# Patient Record
Sex: FEMALE | Race: WHITE
[De-identification: names, ages, dates, MRNs, and addresses within clinical notes are randomized per-mention and may not be internally consistent; named-entity substitution may affect disease eponyms.]

---

## 2017-06-25 ENCOUNTER — HOSPITAL ENCOUNTER (EMERGENCY)
Dept: HOSPITAL 63 - ER | Age: 57
Discharge: ANOTHER HOSPITAL | End: 2017-06-25
Payer: OTHER GOVERNMENT

## 2017-06-25 VITALS — SYSTOLIC BLOOD PRESSURE: 114 MMHG | DIASTOLIC BLOOD PRESSURE: 53 MMHG

## 2017-06-25 VITALS — HEIGHT: 64 IN | BODY MASS INDEX: 24.46 KG/M2 | WEIGHT: 143.3 LBS

## 2017-06-25 DIAGNOSIS — K35.80: Primary | ICD-10-CM

## 2017-06-25 LAB
ALBUMIN SERPL-MCNC: 4.4 G/DL (ref 3.4–5)
ALBUMIN/GLOB SERPL: 1.2 {RATIO} (ref 1–1.7)
ALP SERPL-CCNC: 73 U/L (ref 46–116)
ALT SERPL-CCNC: 43 U/L (ref 14–59)
ANION GAP SERPL CALC-SCNC: 10 MMOL/L (ref 6–14)
AST SERPL-CCNC: 71 U/L (ref 15–37)
BASOPHILS # BLD AUTO: 0 X10^3/UL (ref 0–0.2)
BASOPHILS NFR BLD: 0 % (ref 0–3)
BILIRUB SERPL-MCNC: 1.1 MG/DL (ref 0.2–1)
BUN/CREAT SERPL: 16 (ref 6–20)
CA-I SERPL ISE-MCNC: 16 MG/DL (ref 7–20)
CALCIUM SERPL-MCNC: 9.1 MG/DL (ref 8.5–10.1)
CHLORIDE SERPL-SCNC: 103 MMOL/L (ref 98–107)
CO2 SERPL-SCNC: 27 MMOL/L (ref 21–32)
CREAT SERPL-MCNC: 1 MG/DL (ref 0.6–1)
EOSINOPHIL NFR BLD: 0 % (ref 0–3)
EOSINOPHIL NFR BLD: 0 X10^3/UL (ref 0–0.7)
ERYTHROCYTE [DISTWIDTH] IN BLOOD BY AUTOMATED COUNT: 12.9 % (ref 11.5–14.5)
GFR SERPLBLD BASED ON 1.73 SQ M-ARVRAT: 57.4 ML/MIN
GLOBULIN SER-MCNC: 3.6 G/DL (ref 2.2–3.8)
GLUCOSE SERPL-MCNC: 143 MG/DL (ref 70–99)
HCT VFR BLD CALC: 37.7 % (ref 36–47)
HGB BLD-MCNC: 12.3 G/DL (ref 12–15.5)
LIPASE: 85 U/L (ref 73–393)
LYMPHOCYTES # BLD: 1.6 X10^3/UL (ref 1–4.8)
LYMPHOCYTES NFR BLD AUTO: 12 % (ref 24–48)
MCH RBC QN AUTO: 31 PG (ref 25–35)
MCHC RBC AUTO-ENTMCNC: 33 G/DL (ref 31–37)
MCV RBC AUTO: 95 FL (ref 79–100)
MONO #: 0.7 X10^3/UL (ref 0–1.1)
MONOCYTES NFR BLD: 5 % (ref 0–9)
NEUT #: 11.4 X10^3UL (ref 1.8–7.7)
NEUTROPHILS NFR BLD AUTO: 83 % (ref 31–73)
PLATELET # BLD AUTO: 177 X10^3/UL (ref 140–400)
POTASSIUM SERPL-SCNC: 3.8 MMOL/L (ref 3.5–5.1)
PROT SERPL-MCNC: 8 G/DL (ref 6.4–8.2)
RBC # BLD AUTO: 3.98 X10^6/UL (ref 3.5–5.4)
SODIUM SERPL-SCNC: 140 MMOL/L (ref 136–145)
WBC # BLD AUTO: 13.8 X10^3/UL (ref 4–11)

## 2017-06-25 PROCEDURE — 80053 COMPREHEN METABOLIC PANEL: CPT

## 2017-06-25 PROCEDURE — 99285 EMERGENCY DEPT VISIT HI MDM: CPT

## 2017-06-25 PROCEDURE — 96361 HYDRATE IV INFUSION ADD-ON: CPT

## 2017-06-25 PROCEDURE — 85027 COMPLETE CBC AUTOMATED: CPT

## 2017-06-25 PROCEDURE — 36415 COLL VENOUS BLD VENIPUNCTURE: CPT

## 2017-06-25 PROCEDURE — 96375 TX/PRO/DX INJ NEW DRUG ADDON: CPT

## 2017-06-25 PROCEDURE — 96365 THER/PROPH/DIAG IV INF INIT: CPT

## 2017-06-25 PROCEDURE — 74177 CT ABD & PELVIS W/CONTRAST: CPT

## 2017-06-25 PROCEDURE — 83690 ASSAY OF LIPASE: CPT

## 2017-06-25 RX ADMIN — SODIUM CHLORIDE, PRESERVATIVE FREE PRN ML: 5 INJECTION INTRAVENOUS at 00:40

## 2017-06-25 RX ADMIN — SODIUM CHLORIDE, PRESERVATIVE FREE PRN ML: 5 INJECTION INTRAVENOUS at 00:41

## 2017-06-25 NOTE — RAD
EXAM: Abdomen and pelvis CT with intravenous contrast.

 

HISTORY: 56-year-old female with severe umbilical pain and cramping, 

nausea and vomiting. No previous abdominal surgeries.

 

TECHNIQUE: Computed tomographic images of the abdomen and pelvis were 

obtained following the administration of 75 cc of Omni 300 intravenous 

contrast. Multiplanar reformatting was performed.

 

PQRS compliance statement:  One or more of the following individualized 

dose reduction techniques were utilized for this examination:  1. 

Automated exposure control  2. Adjustment of the mA and/or kV according to

patient size  3. Use of iterative reconstruction technique

 

COMPARISON: 

None.

 

FINDINGS: 

The lung bases demonstrate no acute finding.

 

Periportal edema is present within the liver, as well as prominence of the

IVC, suggesting fluid resuscitation. Otherwise, the liver, spleen, 

gallbladder, pancreas, adrenal glands and bilateral kidneys demonstrate no

focal abnormality. The GI tract demonstrates no dilated bowel loops to 

suggest obstruction. A tubular structure with internal hyperdensity is 

seen within the superior pelvis along the midline, which appears to 

originate from the cecum anteriorly. This tubular structure measures up to

1.2 cm in diameter. A single focus of air is seen distally which appears 

to reside within the distal lumen, which resides within the right lower 

quadrant. This is most suggestive of an enlarged appendix. This is best 

visualized within axial images 57 through 62 and coronal images 33 through

35. No adjacent free air or free fluid is seen. The urinary bladder is 

grossly unremarkable. Trace free fluid is present in the pelvis, which may

be physiologic. Otherwise, no intra-abdominal or pelvic free fluid, free 

air or significant lymphadenopathy is seen. Aorta is normal in caliber. 

There are prominent vessels demonstrated along the periphery of the 

uterus, with calcifications within the region of the uterine fundus which 

may suggest a fibroid.

 

Overlying soft tissues and visualized osseous structures demonstrate no 

acute or suspicious finding.

 

IMPRESSION:

1. CT findings consistent with acute appendicitis. No evidence of 

perforation or adjacent abscess formation.

2. Probable fundal uterine fibroid, can be best evaluated with nonemergent

ultrasound.

 

Electronically signed by: Tamiko Magana MD (6/25/2017 1:48 AM)

## 2017-06-25 NOTE — PHYS DOC
Past History


Past Medical History:  No Pertinent History


Past Surgical History:  No Surgical History


Smoking:  Non-smoker


Alcohol Use:  Occasionally


Drug Use:  None





Adult General


Chief Complaint


Chief Complaint:  nausea vomiting diarrhea





[facility]





This is a pleasant 56 year old female with no major medical problems no major 

surgeries or past history presenting with nausea vomiting diarrhea that began 

yesterday after eating with the  suspects is bad fish. This morning when 

she woke she developed crampy lower abdominal pain with nonbilious nonbloody 

vomiting 4 with multiple episodes of retching. She's had loose stool without 

mucus or blood as well. She's had chills without subjective fever she denies 

any prior symptoms like this in the past. She denies any recent antibiotic use, 

travel outside the country, she has not consumed any raw food-like oysters or 

fish, denies any sick contacts, denies any heavily of poultry or reptiles.





Review of Systems


Review of Systems





Constitutional: She has complained of subjective fevers and chills.


Eyes: Denies change in visual acuity, redness, or eye pain []


HENT: Denies nasal congestion or sore throat []


Respiratory: Denies cough or shortness of breath []


Cardiovascular: No additional information not addressed in HPI []


GI: The seventh history of nausea and nonbilious nonbloody vomiting diarrhea 

without blood or mucus.


: Denies dysuria or hematuria []


Musculoskeletal: Denies back pain or joint pain []


Integument: Denies rash or skin lesions []


Neurologic: Denies headache, focal weakness or sensory changes []


Endocrine: Denies polyuria or polydipsia []





Allergies


Allergies





Allergies








Coded Allergies Type Severity Reaction Last Updated Verified


 


  No Known Drug Allergies    3/8/14 No











Physical Exam


Physical Exam


nO fever noted or tachycardia noted on vital signs patient mild hypotensive


Constitutional: Well developed, well nourished, patient is very uncomfortable 

sitting upright actively vomiting into a bucket.


HENT: Normocephalic, atraumatic, bilateral external ears normal, dry mucous 

membranes no oral exudates, nose normal. []


Eyes: PERRLA, EOMI, conjunctiva normal, no discharge. [] 


Neck: Normal range of motion, no tenderness, supple, no stridor. [] 


Cardiovascular:Heart rate regular rhythm, no murmur []


Lungs & Thorax:  Bilateral breath sounds clear to auscultation []


Abdomen: Increased bowel sounds, tender to palpation the right lower quadrant 

with no voluntary guarding


Skin: Warm, dry, no erythema, no rash. [] 


Extremities: No tenderness, no cyanosis, no clubbing, ROM intact, no edema. [] 


Neurologic: Alert and oriented X 3, normal motor function, normal sensory 

function, no focal deficits noted. []


Psychologic: Affect normal, judgement normal, mood normal. []





Current Patient Data


Vital Signs





Vital Signs








  Date Time  Temp Pulse Resp B/P (MAP) Pulse Ox O2 Delivery O2 Flow Rate FiO2


 


17 00:22 98.0 60 20  99 Room Air  








Lab Results





 Laboratory Tests








Test


  17


00:22


 


White Blood Count


  13.8 x10^3/uL


(4.0-11.0)  H


 


Red Blood Count


  3.98 x10^6/uL


(3.50-5.40)


 


Hemoglobin


  12.3 g/dL


(12.0-15.5)


 


Hematocrit


  37.7 %


(36.0-47.0)


 


Mean Corpuscular Volume


  95 fL ()


 


 


Mean Corpuscular Hemoglobin 31 pg (25-35)  


 


Mean Corpuscular Hemoglobin


Concent 33 g/dL


(31-37)


 


Red Cell Distribution Width


  12.9 %


(11.5-14.5)


 


Platelet Count


  177 x10^3/uL


(140-400)


 


Neutrophils (%) (Auto) 83 % (31-73)  H


 


Lymphocytes (%) (Auto) 12 % (24-48)  L


 


Monocytes (%) (Auto) 5 % (0-9)  


 


Eosinophils (%) (Auto) 0 % (0-3)  


 


Basophils (%) (Auto) 0 % (0-3)  


 


Neutrophils # (Auto)


  11.4 x10^3uL


(1.8-7.7)  H


 


Lymphocytes # (Auto)


  1.6 x10^3/uL


(1.0-4.8)


 


Monocytes # (Auto)


  0.7 x10^3/uL


(0.0-1.1)


 


Eosinophils # (Auto)


  0.0 x10^3/uL


(0.0-0.7)


 


Basophils # (Auto)


  0.0 x10^3/uL


(0.0-0.2)


 


Sodium Level


  140 mmol/L


(136-145)


 


Potassium Level


  3.8 mmol/L


(3.5-5.1)


 


Chloride Level


  103 mmol/L


()


 


Carbon Dioxide Level


  27 mmol/L


(21-32)


 


Anion Gap 10 (6-14)  


 


Blood Urea Nitrogen


  16 mg/dL


(7-20)


 


Creatinine


  1.0 mg/dL


(0.6-1.0)


 


Estimated GFR


(Cockcroft-Gault) 57.4  


 


 


BUN/Creatinine Ratio 16 (6-20)  


 


Glucose Level


  143 mg/dL


(70-99)  H


 


Calcium Level


  9.1 mg/dL


(8.5-10.1)


 


Total Bilirubin


  1.1 mg/dL


(0.2-1.0)  H


 


Aspartate Amino Transferase


(AST) 71 U/L (15-37)


H


 


Alanine Aminotransferase (ALT)


  43 U/L (14-59)


 


 


Alkaline Phosphatase


  73 U/L


()


 


Total Protein


  8.0 g/dL


(6.4-8.2)


 


Albumin


  4.4 g/dL


(3.4-5.0)


 


Albumin/Globulin Ratio 1.2 (1.0-1.7)  


 


Lipase


  85 U/L


()











EKG


EKG


[]





Radiology/Procedures


Radiology/Procedures


[] [address] [place] [address]


 [phone]


 


 IMAGING REPORT





 Signed





PATIENT: KASH SANTACRUZ ACCOUNT: [id number] MRN#: [id number]


: 1960 LOCATION: ER AGE: 56


SEX: F EXAM DT: 17 ACCESSION#: [id number]


STATUS: REG ER ORD. PHYSICIAN: DELORIS DOUGLAS MD 


REASON: diffuse abdominal pain,nausea and vomiting


PROCEDURE: CT ABD PELV W/ IV CONTRST ONLY





EXAM: Abdomen and pelvis CT with intravenous contrast.


 


HISTORY: 56-year-old female with severe umbilical pain and cramping, 


nausea and vomiting. No previous abdominal surgeries.


 


TECHNIQUE: Computed tomographic images of the abdomen and pelvis were 


obtained following the administration of 75 cc of Omni 300 intravenous 


contrast. Multiplanar reformatting was performed.


 


PQRS compliance statement:  One or more of the following individualized 


dose reduction techniques were utilized for this examination:  1. 


Automated exposure control  2. Adjustment of the mA and/or kV according to


patient size  3. Use of iterative reconstruction technique


 


COMPARISON: 


None.


 


FINDINGS: 


The lung bases demonstrate no acute finding.


 


Periportal edema is present within the liver, as well as prominence of the


IVC, suggesting fluid resuscitation. Otherwise, the liver, spleen, 


gallbladder, pancreas, adrenal glands and bilateral kidneys demonstrate no


focal abnormality. The GI tract demonstrates no dilated bowel loops to 


suggest obstruction. A tubular structure with internal hyperdensity is 


seen within the superior pelvis along the midline, which appears to 


originate from the cecum anteriorly. This tubular structure measures up to


1.2 cm in diameter. A single focus of air is seen distally which appears 


to reside within the distal lumen, which resides within the right lower 


quadrant. This is most suggestive of an enlarged appendix. This is best 


visualized within axial images 57 through 62 and coronal images 33 through


35. No adjacent free air or free fluid is seen. The urinary bladder is 


grossly unremarkable. Trace free fluid is present in the pelvis, which may


be physiologic. Otherwise, no intra-abdominal or pelvic free fluid, free 


air or significant lymphadenopathy is seen. Aorta is normal in caliber. 


There are prominent vessels demonstrated along the periphery of the 


uterus, with calcifications within the region of the uterine fundus which 


may suggest a fibroid.


 


Overlying soft tissues and visualized osseous structures demonstrate no 


acute or suspicious finding.


 


IMPRESSION:


1. CT findings consistent with acute appendicitis. No evidence of 


perforation or adjacent abscess formation.


2. Probable fundal uterine fibroid, can be best evaluated with nonemergent


ultrasound.


 


Electronically signed by: Aquiles Arambula MD (2017 1:48 AM)














DICTATED AND SIGNED BY:     AQUILES ARAMBULA MD


DATE:     17 0138





CC: DELORIS DOUGLAS MD; PCP,NO ~





Course & Med Decision Making


Course & Med Decision Making


Pertinent Labs and Imaging studies reviewed. (See chart for details)








Time is 1:45 AM Patient tells me that their symptoms given during CC are 

improved.  We reviewed labs which demonstrated an elevated white count and low 

LFTs CT scan of the abdomen and pelvis still pending.








Time is now 2:04 am Patient tells me that their symptoms given during CC are 

improved.  We reviewed labs and radiology reports with patient and any family 

at bedside. There is evidence on CAT scan of the abdomen pelvis of acute 

appendicitis without perforation or abscess.





Consultant note: Original consult general surgery / Elissa





Consultant called at of the service 2:14 AM


Consult called back at 2:16 AM





Discussed the case I presented and they agreed with admission. Time of 

acceptance 2: 16 a.m. advised admission to internal medicine hospitalist 

service and will take her to the ER later today.  surgeon asked that I give her 

antibiotics early [facility] was her specific choice





Consultant note: General medicine





Consultant called at of the service initially paged at 2:16 AM


Consult called back at





Discussed the case I presented and they agreed with admission. Time of 

acceptance





[] Impression: Acute appendicitis, nausea vomiting diarrhea


Disposition transferred to [facility] under the care of internal 

medicine and general surgery evaluation for surgical removal of appendicitis.





Dragon Disclaimer


Dragon Disclaimer


This chart was dictated in whole or in part using Voice Recognition software in 

a busy, high-work load, and often noisy Emergency Department environment.  It 

may contain unintended and wholly unrecognized errors or omissions.





Departure


Departure:


Impression:  


 Primary Impression:  


 Acute appendicitis


Disposition:  05 XFER OTHER


Condition:  GUARDED


Referrals:  


PCP,NO (PCP)











DELORIS DOUGLAS MD 2017 00:24

## 2017-06-30 ENCOUNTER — HOSPITAL ENCOUNTER (EMERGENCY)
Dept: HOSPITAL 63 - ER | Age: 57
Discharge: ANOTHER HOSPITAL | End: 2017-06-30
Payer: OTHER GOVERNMENT

## 2017-06-30 VITALS — WEIGHT: 143.3 LBS | HEIGHT: 64 IN | BODY MASS INDEX: 24.46 KG/M2

## 2017-06-30 VITALS — DIASTOLIC BLOOD PRESSURE: 58 MMHG | SYSTOLIC BLOOD PRESSURE: 128 MMHG

## 2017-06-30 DIAGNOSIS — T81.89XA: Primary | ICD-10-CM

## 2017-06-30 DIAGNOSIS — R10.9: ICD-10-CM

## 2017-06-30 DIAGNOSIS — Z90.49: ICD-10-CM

## 2017-06-30 LAB
ALBUMIN SERPL-MCNC: 4.3 G/DL (ref 3.4–5)
ALBUMIN/GLOB SERPL: 1 {RATIO} (ref 1–1.7)
ALP SERPL-CCNC: 76 U/L (ref 46–116)
ALT SERPL-CCNC: 108 U/L (ref 14–59)
ANION GAP SERPL CALC-SCNC: 8 MMOL/L (ref 6–14)
APTT PPP: YELLOW S
AST SERPL-CCNC: 105 U/L (ref 15–37)
BACTERIA #/AREA URNS HPF: 0 /HPF
BASOPHILS # BLD AUTO: 0.1 X10^3/UL (ref 0–0.2)
BASOPHILS NFR BLD: 1 % (ref 0–3)
BILIRUB SERPL-MCNC: 0.7 MG/DL (ref 0.2–1)
BILIRUB UR QL STRIP: (no result)
BUN/CREAT SERPL: 19 (ref 6–20)
CA-I SERPL ISE-MCNC: 17 MG/DL (ref 7–20)
CALCIUM SERPL-MCNC: 9.3 MG/DL (ref 8.5–10.1)
CHLORIDE SERPL-SCNC: 104 MMOL/L (ref 98–107)
CO2 SERPL-SCNC: 29 MMOL/L (ref 21–32)
CREAT SERPL-MCNC: 0.9 MG/DL (ref 0.6–1)
EOSINOPHIL NFR BLD: 0.2 X10^3/UL (ref 0–0.7)
EOSINOPHIL NFR BLD: 3 % (ref 0–3)
ERYTHROCYTE [DISTWIDTH] IN BLOOD BY AUTOMATED COUNT: 12.6 % (ref 11.5–14.5)
FIBRINOGEN PPP-MCNC: CLEAR MG/DL
GFR SERPLBLD BASED ON 1.73 SQ M-ARVRAT: 64.8 ML/MIN
GLOBULIN SER-MCNC: 4.2 G/DL (ref 2.2–3.8)
GLUCOSE SERPL-MCNC: 98 MG/DL (ref 70–99)
GLUCOSE UR STRIP-MCNC: (no result) MG/DL
HCT VFR BLD CALC: 34.6 % (ref 36–47)
HGB BLD-MCNC: 11.5 G/DL (ref 12–15.5)
LIPASE: 123 U/L (ref 73–393)
LYMPHOCYTES # BLD: 1.9 X10^3/UL (ref 1–4.8)
LYMPHOCYTES NFR BLD AUTO: 29 % (ref 24–48)
MCH RBC QN AUTO: 32 PG (ref 25–35)
MCHC RBC AUTO-ENTMCNC: 33 G/DL (ref 31–37)
MCV RBC AUTO: 95 FL (ref 79–100)
MONO #: 0.5 X10^3/UL (ref 0–1.1)
MONOCYTES NFR BLD: 8 % (ref 0–9)
NEUT #: 4 X10^3UL (ref 1.8–7.7)
NEUTROPHILS NFR BLD AUTO: 60 % (ref 31–73)
NITRITE UR QL STRIP: (no result)
PLATELET # BLD AUTO: 194 X10^3/UL (ref 140–400)
POTASSIUM SERPL-SCNC: 4.4 MMOL/L (ref 3.5–5.1)
PROT SERPL-MCNC: 8.5 G/DL (ref 6.4–8.2)
RBC # BLD AUTO: 3.64 X10^6/UL (ref 3.5–5.4)
RBC #/AREA URNS HPF: (no result) /HPF (ref 0–2)
SODIUM SERPL-SCNC: 141 MMOL/L (ref 136–145)
SP GR UR STRIP: 1.01
SQUAMOUS #/AREA URNS LPF: (no result) /LPF
UROBILINOGEN UR-MCNC: 0.2 MG/DL
WBC # BLD AUTO: 6.7 X10^3/UL (ref 4–11)
WBC #/AREA URNS HPF: (no result) /HPF (ref 0–4)

## 2017-06-30 PROCEDURE — 85027 COMPLETE CBC AUTOMATED: CPT

## 2017-06-30 PROCEDURE — 96376 TX/PRO/DX INJ SAME DRUG ADON: CPT

## 2017-06-30 PROCEDURE — 74177 CT ABD & PELVIS W/CONTRAST: CPT

## 2017-06-30 PROCEDURE — 99285 EMERGENCY DEPT VISIT HI MDM: CPT

## 2017-06-30 PROCEDURE — 81025 URINE PREGNANCY TEST: CPT

## 2017-06-30 PROCEDURE — 80053 COMPREHEN METABOLIC PANEL: CPT

## 2017-06-30 PROCEDURE — 36415 COLL VENOUS BLD VENIPUNCTURE: CPT

## 2017-06-30 PROCEDURE — 83690 ASSAY OF LIPASE: CPT

## 2017-06-30 PROCEDURE — 81001 URINALYSIS AUTO W/SCOPE: CPT

## 2017-06-30 PROCEDURE — 96375 TX/PRO/DX INJ NEW DRUG ADDON: CPT

## 2017-06-30 PROCEDURE — 96374 THER/PROPH/DIAG INJ IV PUSH: CPT

## 2017-06-30 RX ADMIN — HYDROMORPHONE HYDROCHLORIDE PRN MG: 1 INJECTION, SOLUTION INTRAMUSCULAR; INTRAVENOUS; SUBCUTANEOUS at 23:05

## 2017-06-30 RX ADMIN — HYDROMORPHONE HYDROCHLORIDE PRN MG: 1 INJECTION, SOLUTION INTRAMUSCULAR; INTRAVENOUS; SUBCUTANEOUS at 22:05

## 2017-06-30 NOTE — RAD
CT Abdomen and Pelvis With Intravenous Contrast:

 

History: Status post appendectomy 5 days earlier, cramping.

 

Comparison: CT abdomen and pelvis June 25, 2017.

 

Technique: After administration of intravenous contrast administration, 75

mL Omnipaque-300, CT of the abdomen and pelvis was performed. 

 

Exposure: One or more of the following individualized dose reduction 

techniques were utilized for this examination:  1. Automated exposure 

control  2. Adjustment of the mA and/or kV according to patient size  3. 

Use of iterative reconstruction technique

 

Findings:

 

Motion artifact is seen at several levels, this could obscure subtle 

abnormalities.  Evaluation of enteric structures is limited by lack of 

oral contrast.

 

Small right and trace left pleural effusions are seen.

 

Liver, spleen, pancreas, gallbladder, bilateral adrenal glands 

unremarkable.  Bilateral kidneys enhance symmetrically.  Right kidney is 

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  Urinary bladder is

unremarkable.  Leiomyomatous uterus is seen.

 

There has been interval appendectomy.  Cecum appears to be a near the 

midline as the suture material is seen in the midline, presumably from the

appendectomy.  There are multiple mildly dilated loops of small bowel 

within the pelvis which measure up to 3 cm in diameter.  Since the 

previous study, there is evidence of portal of the mesentery and vessels 

adjacent to the appendectomy site.  Combined with the interval evidence of

bowel obstruction, possibility of internal hernia is raised.  There is a 

small amount of free fluid present in the pelvis, could be sympathetic to 

the bowel obstruction versus postsurgical.  No abscess is identified.

 

 

Impression:

1.  Interval appendectomy.  There is interval development of low-grade 

bowel obstruction.  Orientation of the bowel loops as well as whirling of 

the mesentery and vessels raises possibility of internal hernia.  Surgical

consultation is recommended.

2.  No perforation or abscess is seen.

3.  Findings discussed with emergency department staff, Dr. Saleh, at 

2215 hours.

 

Electronically signed by: Genaro Guzman MD (6/30/2017 10:16 PM)

## 2017-07-01 NOTE — PHYS DOC
Past History


Past Medical History:  No Pertinent History


Past Surgical History:  Appendectomy


Smoking:  Non-smoker


Alcohol Use:  None


Drug Use:  None





Adult General


Chief Complaint


Chief Complaint:  ABDOMINAL PAIN





HPI


HPI


56-year-old female status post recent appendectomy now presents emergency 

department complaining of worsening pain over the last day. Patient has nausea 

vomiting and has had some recent diarrhea. Diarrhea resolved morning and she 

has not had any other episodes today. He has recently been on antibiotics. 

Denies fevers chills sweats or shaking chills. She communicated with the 

surgeon on-call for her doctor and was referred to the emergency department for 

evaluation.





Review of Systems


Review of Systems





Constitutional: Denies fever or chills []


Eyes: Denies change in visual acuity, redness, or eye pain []


HENT: Denies nasal congestion or sore throat []


Respiratory: Denies cough or shortness of breath []


Cardiovascular: No additional information not addressed in HPI []


GI: Denies abdominal pain, nausea, vomiting, bloody stools or diarrhea []


: Denies dysuria or hematuria []


Musculoskeletal: Denies back pain or joint pain []


Integument: Denies rash or skin lesions []


Neurologic: Denies headache, focal weakness or sensory changes []


Endocrine: Denies polyuria or polydipsia []





Current Medications


Current Medications





Current Medications








 Medications


  (Trade)  Dose


 Ordered  Sig/Naima  Start Time


 Stop Time Status Last Admin


Dose Admin


 


 Hydromorphone HCl


  (Dilaudid)  0.5 mg  PRN Q1HR  PRN  6/30/17 21:15


 6/30/17 23:45 DC 6/30/17 22:05


0.5 MG


 


 Iohexol


  (Omnipaque 300


 Mg/ml)  75 ml  1X  ONCE  6/30/17 21:45


 6/30/17 21:46 DC 6/30/17 21:45


75 ML


 


 Ondansetron HCl


  (Zofran)  4 mg  PRN Q1HR  PRN  6/30/17 21:35


 6/30/17 23:45 DC 6/30/17 22:15


4 MG











Allergies


Allergies





Allergies








Coded Allergies Type Severity Reaction Last Updated Verified


 


  No Known Drug Allergies    6/30/17 No











Physical Exam


Physical Exam


Alert no acute distress mild diffuse tenderness to the abdomen. No guarding or 

rebound


Constitutional: Well developed, well nourished, no acute distress, non-toxic 

appearance. []


HENT: Normocephalic, atraumatic, bilateral external ears normal, oropharynx 

moist, no oral exudates, nose normal. []


Eyes: PERRLA, EOMI, conjunctiva normal, no discharge. [] 


Neck: Normal range of motion, no tenderness, supple, no stridor. [] 


Cardiovascular:Heart rate regular rhythm, no murmur []


Lungs & Thorax:  Bilateral breath sounds clear to auscultation []


Abdomen: Bowel sounds normal, soft, as above, no masses, no pulsatile masses. [

] 


Skin: Warm, dry, no erythema, no rash. [] 


Back: No tenderness, no CVA tenderness. [] 


Extremities: No tenderness, no cyanosis, no clubbing, ROM intact, no edema. [] 


Neurologic: Alert and oriented X 3, normal motor function, normal sensory 

function, no focal deficits noted. []


Psychologic: Affect normal, judgement normal, mood normal. []





Current Patient Data


Vital Signs





 Vital Signs








  Date Time  Temp Pulse Resp B/P (MAP) Pulse Ox O2 Delivery O2 Flow Rate FiO2


 


6/30/17 23:26  53 16 128/58 (81) 92  2.0 


 


6/30/17 22:11 99.7       


 


6/30/17 21:38      Room Air  








Lab Results





 Laboratory Tests








Test


  6/30/17


21:00 6/30/17


21:38 6/30/17


21:44


 


White Blood Count


  6.7 x10^3/uL


(4.0-11.0)  # 


  


 


 


Red Blood Count


  3.64 x10^6/uL


(3.50-5.40) 


  


 


 


Hemoglobin


  11.5 g/dL


(12.0-15.5)  L 


  


 


 


Hematocrit


  34.6 %


(36.0-47.0)  L 


  


 


 


Mean Corpuscular Volume


  95 fL ()


  


  


 


 


Mean Corpuscular Hemoglobin 32 pg (25-35)    


 


Mean Corpuscular Hemoglobin


Concent 33 g/dL


(31-37) 


  


 


 


Red Cell Distribution Width


  12.6 %


(11.5-14.5) 


  


 


 


Platelet Count


  194 x10^3/uL


(140-400) 


  


 


 


Neutrophils (%) (Auto) 60 % (31-73)    


 


Lymphocytes (%) (Auto) 29 % (24-48)    


 


Monocytes (%) (Auto) 8 % (0-9)    


 


Eosinophils (%) (Auto) 3 % (0-3)    


 


Basophils (%) (Auto) 1 % (0-3)    


 


Neutrophils # (Auto)


  4.0 x10^3uL


(1.8-7.7) 


  


 


 


Lymphocytes # (Auto)


  1.9 x10^3/uL


(1.0-4.8) 


  


 


 


Monocytes # (Auto)


  0.5 x10^3/uL


(0.0-1.1) 


  


 


 


Eosinophils # (Auto)


  0.2 x10^3/uL


(0.0-0.7) 


  


 


 


Basophils # (Auto)


  0.1 x10^3/uL


(0.0-0.2) 


  


 


 


Sodium Level


  141 mmol/L


(136-145) 


  


 


 


Potassium Level


  4.4 mmol/L


(3.5-5.1) 


  


 


 


Chloride Level


  104 mmol/L


() 


  


 


 


Carbon Dioxide Level


  29 mmol/L


(21-32) 


  


 


 


Anion Gap 8 (6-14)    


 


Blood Urea Nitrogen


  17 mg/dL


(7-20) 


  


 


 


Creatinine


  0.9 mg/dL


(0.6-1.0) 


  


 


 


Estimated GFR


(Cockcroft-Gault) 64.8  


  


  


 


 


BUN/Creatinine Ratio 19 (6-20)    


 


Glucose Level


  98 mg/dL


(70-99) 


  


 


 


Calcium Level


  9.3 mg/dL


(8.5-10.1) 


  


 


 


Total Bilirubin


  0.7 mg/dL


(0.2-1.0) 


  


 


 


Aspartate Amino Transferase


(AST) 105 U/L


(15-37)  H 


  


 


 


Alanine Aminotransferase (ALT)


  108 U/L


(14-59)  H 


  


 


 


Alkaline Phosphatase


  76 U/L


() 


  


 


 


Total Protein


  8.5 g/dL


(6.4-8.2)  H 


  


 


 


Albumin


  4.3 g/dL


(3.4-5.0) 


  


 


 


Albumin/Globulin Ratio 1.0 (1.0-1.7)    


 


Lipase


  123 U/L


() 


  


 


 


Urine Collection Type  Unknown   


 


Urine Color  Yellow   


 


Urine Clarity  Clear   


 


Urine pH  6.0   


 


Urine Specific Gravity  1.015   


 


Urine Protein


  


  Neg


(NEG-TRACE) 


 


 


Urine Glucose (UA)


  


  Neg mg/dL


(NEG) 


 


 


Urine Ketones (Stick)


  


  Neg mg/dL


(NEG) 


 


 


Urine Blood  Trace (NEG)   


 


Urine Nitrite  Neg (NEG)   


 


Urine Bilirubin  Neg (NEG)   


 


Urine Urobilinogen Dipstick


  


  0.2 mg/dL (0.2


mg/dL) 


 


 


Urine Leukocyte Esterase  Neg (NEG)   


 


Urine RBC


  


  Rare /HPF


(0-2) 


 


 


Urine WBC


  


  Occ /HPF (0-4)


  


 


 


Urine Squamous Epithelial


Cells 


  Occ /LPF  


  


 


 


Urine Bacteria


  


  0 /HPF (0-FEW)


  


 


 


Urine Mucus  Slight /LPF   


 


POC Urine HCG, Qualitative


  


  


  hcg negative


(Negative)











EKG


EKG


[]





Radiology/Procedures


Radiology/Procedures


[]





Course & Med Decision Making


Course & Med Decision Making


Pertinent Labs and Imaging studies reviewed. (See chart for details)


Enzymes and symptoms consistent with suspected of postoperative complication in 

a hemodynamically stable patient who is afebrile. His unremarkable and CT shows 

small bowel obstruction with possible internal hernia per radiology. Case 

immediately discussed with surgery on-call at [facility] where 

patient was treated surgically. Surgeon aware of the history and findings and 

agrees to provide consultation upon the patient's arrival however requests 

admission to the hospitalist service. case discussed with hospitalist Dr. Rivera 

at [facility] . She is aware of history and findings and accepts 

patient for inpatient admission to the surgical floor bed with consult to 

surgery.





[]





Dragon Disclaimer


Dragon Disclaimer


This chart was dictated in whole or in part using Voice Recognition software in 

a busy, high-work load, and often noisy Emergency Department environment.  It 

may contain unintended and wholly unrecognized errors or omissions.





Departure


Departure:


Impression:  


 Primary Impression:  


 Post-operative complication


 Additional Impression:  


 Abdominal pain


Disposition:  02 XFER SHT-TRM HOSP


Condition:  GUARDED


Referrals:  


AQUILES MARTINEZ MD (PCP)





Problem Qualifiers











MARAL AMAYA MD Jul 1, 2017 04:02

## 2017-07-09 ENCOUNTER — HOSPITAL ENCOUNTER (EMERGENCY)
Dept: HOSPITAL 63 - ER | Age: 57
Discharge: HOME | End: 2017-07-09
Payer: OTHER GOVERNMENT

## 2017-07-09 VITALS — SYSTOLIC BLOOD PRESSURE: 121 MMHG | DIASTOLIC BLOOD PRESSURE: 63 MMHG

## 2017-07-09 VITALS — HEIGHT: 64 IN | WEIGHT: 143.3 LBS | BODY MASS INDEX: 24.46 KG/M2
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
## 2017-07-09 DIAGNOSIS — S86.911A: Primary | ICD-10-CM

## 2017-07-09 PROCEDURE — 93971 EXTREMITY STUDY: CPT

## 2017-07-09 NOTE — RAD
Right lower extremity venous duplex Doppler ultrasound

 

HISTORY: Right leg pain

 

TECHNIQUE: Grayscale and duplex Doppler sonography were utilized.

 

FINDINGS: No evidence of deep venous thrombosis by grayscale sonography 

with compressibility, patent color Doppler blood flow and augmentation of 

blood flow of the right common femoral vein, profunda femoral vein, 

superficial femoral vein and popliteal vein. Patent color Doppler blood 

flow of the deep calf veins documented.

 

IMPRESSION: Negative right leg for deep venous thrombosis.

 

Electronically signed by: Carlo Jones MD (7/9/2017 5:29 PM) [facility]

## 2017-07-09 NOTE — PHYS DOC
General


Chief Complaint:  LOWER EXT PAIN


Stated Complaint:  RT LEG PAIN


Time Seen by MD:  15:46


Source:  patient, old records


Exam Limitations:  no limitations


Problems:  





History of Present Illness


Initial Comments


Patient is a 56-year-old female sent to the ED for evaluation of right leg pain.


Patient underwent appendectomy on  at [facility]. On  she was diagnosed with a small bowel obstruction complication from the 

procedure. She says last night her bowels began to move fully and she had 

another bowel movement this morning so she is trying to avoid taking any more 

pain medications. This morning she noticed pain behind her right knee radiating 

distally down to the mid calf. She denies any trauma, strenuous activities, or 

other possible inciting events. She called Dr. Hernandez her surgeon who directed 

her to the emergency department for ultrasound evaluation to rule out lower 

extremity DVT.


Patient denies chest pain difficulty breathing fever chills sweats or myalgias 

nausea vomiting headache or focal neurologic deficit.


Onset:  this morning


Severity:  mild


Pain/Injury Location:  right leg, right knee


Method of Injury:  unknown


Modifying Factors:  worse with jarring, worse with movement, improves with rest


Allergies:  


Coded Allergies:  


     No Known Drug Allergies (Unverified , 17)





Past Medical History


Medical History:  no pertinent history


Surgical History:  appendectomy





Social History


Smoker:  non-smoker


Alcohol:  none


Drugs:  none





Review of Systems


Constitutional:  denies chills, denies fever, denies malaise


EENTM:  denies eye pain, denies blurred vision, denies ear pain, denies nose 

pain


Respiratory:  denies cough, denies shortness of breath, denies wheezing


Cardiovascular:  denies chest pain, denies palpitations, denies syncope


Gastrointestinal:  denies diarrhea, denies nausea, denies vomiting


Musculoskeletal:  see HPI


Psychiatric/Neurological:  see HPI





Physical Exam


General Appearance:  WD/WN, no apparent distress


HEENT:  normal ENT inspection


Neck:  non-tender, supple


Cardiovascular/Respiratory:  normal peripheral pulses, normal breath sounds


Back:  no CVA tenderness (he's not 100 abnormality), no vertebral tenderness


Legs:  bilateral leg non-tender, bilateral leg normal inspection, bilateral leg 

normal range of motion, bilateral leg no evidence of injury


Knees:  bilateral knee non-tender, bilateral knee normal inspection, bilateral 

knee normal range of motion, bilateral knee no evidence of injury


Neurologic/Tendon:  normal sensation, normal motor functions, normal tendon 

functions, responds to pain, no evidence tendon injury


Psychiatric:  alert, oriented x 3


Skin:  normal color, warm/dry





Orders, Labs, Meds


Ultrasound has been ordered.











PATIENT: KASH SANTACRUZ ACCOUNT: [id number] MRN#: [id number]


: 1960 LOCATION: ER AGE: 56


SEX: F EXAM DT: 17 ACCESSION#: [id number]


STATUS: REG ER ORD. PHYSICIAN: LEIDA GOLDSMITH DO 


REASON: pain in right leg


PROCEDURE: VENOUS LOWER EXTREMITY RIGHT





Right lower extremity venous duplex Doppler ultrasound


 


HISTORY: Right leg pain


 


TECHNIQUE: Grayscale and duplex Doppler sonography were utilized.


 


FINDINGS: No evidence of deep venous thrombosis by grayscale sonography 


with compressibility, patent color Doppler blood flow and augmentation of 


blood flow of the right common femoral vein, profunda femoral vein, 


superficial femoral vein and popliteal vein. Patent color Doppler blood 


flow of the deep calf veins documented.


 


IMPRESSION: Negative right leg for deep venous thrombosis.


 


Electronically signed by: Chas Jones MD (2017 5:29 PM) [facility]














DICTATED AND SIGNED BY:     CHAS JONES MD


DATE:     17 1728





CC: AQUILES MARTINEZ MD; LEIDA GOLDSMITH DO ~








I discussed findings with patient and her spouse and her questions were 

answered. They expressed agreement and understanding with the treatment plan.


Departure


Time of Disposition:  17:38


Disposition:  01 HOME, SELF-CARE


Diagnosis:  muscle strain


Condition:  GOOD


Patient Instructions:  Muscle Strain, Easy-to-Read





Additional Instructions:  


Continue current meds.


Activity as tolerated.


Heating pad to affected area 15-20 minutes 4-6 times daily followed by gentle 

stretching.


Over-the-counter Tylenol or ibuprofen as needed.


Follow-up with your doctor as scheduled.


Return to the ED with new or changing symptoms.











LEIDA GOLDSMITH DO 2017 16:10

## 2018-05-15 ENCOUNTER — HOSPITAL ENCOUNTER (EMERGENCY)
Dept: HOSPITAL 63 - ER | Age: 58
Discharge: HOME | End: 2018-05-15
Payer: OTHER GOVERNMENT

## 2018-05-15 VITALS — WEIGHT: 140 LBS | BODY MASS INDEX: 20.73 KG/M2 | HEIGHT: 69 IN

## 2018-05-15 VITALS
DIASTOLIC BLOOD PRESSURE: 49 MMHG | SYSTOLIC BLOOD PRESSURE: 101 MMHG | DIASTOLIC BLOOD PRESSURE: 49 MMHG | SYSTOLIC BLOOD PRESSURE: 101 MMHG

## 2018-05-15 DIAGNOSIS — I95.9: ICD-10-CM

## 2018-05-15 DIAGNOSIS — Z90.49: ICD-10-CM

## 2018-05-15 DIAGNOSIS — E86.0: ICD-10-CM

## 2018-05-15 DIAGNOSIS — K52.9: Primary | ICD-10-CM

## 2018-05-15 LAB
ALBUMIN SERPL-MCNC: 3.8 G/DL (ref 3.4–5)
ALBUMIN/GLOB SERPL: 1.1 {RATIO} (ref 1–1.7)
ALP SERPL-CCNC: 72 U/L (ref 46–116)
ALT SERPL-CCNC: 29 U/L (ref 14–59)
ANION GAP SERPL CALC-SCNC: 9 MMOL/L (ref 6–14)
APTT PPP: YELLOW S
AST SERPL-CCNC: 28 U/L (ref 15–37)
BACTERIA #/AREA URNS HPF: 0 /HPF
BASOPHILS # BLD AUTO: 0 X10^3/UL (ref 0–0.2)
BASOPHILS NFR BLD: 0 % (ref 0–3)
BILIRUB SERPL-MCNC: 1.3 MG/DL (ref 0.2–1)
BILIRUB UR QL STRIP: (no result)
BUN/CREAT SERPL: 20 (ref 6–20)
CA-I SERPL ISE-MCNC: 22 MG/DL (ref 7–20)
CALCIUM SERPL-MCNC: 8.9 MG/DL (ref 8.5–10.1)
CHLORIDE SERPL-SCNC: 104 MMOL/L (ref 98–107)
CO2 SERPL-SCNC: 29 MMOL/L (ref 21–32)
CREAT SERPL-MCNC: 1.1 MG/DL (ref 0.6–1)
EOSINOPHIL NFR BLD: 0 % (ref 0–3)
EOSINOPHIL NFR BLD: 0 X10^3/UL (ref 0–0.7)
ERYTHROCYTE [DISTWIDTH] IN BLOOD BY AUTOMATED COUNT: 12.3 % (ref 11.5–14.5)
FIBRINOGEN PPP-MCNC: CLEAR MG/DL
GFR SERPLBLD BASED ON 1.73 SQ M-ARVRAT: 51.2 ML/MIN
GLOBULIN SER-MCNC: 3.6 G/DL (ref 2.2–3.8)
GLUCOSE SERPL-MCNC: 136 MG/DL (ref 70–99)
GLUCOSE UR STRIP-MCNC: (no result) MG/DL
HCT VFR BLD CALC: 40.8 % (ref 36–47)
HGB BLD-MCNC: 14 G/DL (ref 12–15.5)
LIPASE: 99 U/L (ref 73–393)
LYMPHOCYTES # BLD: 0.2 X10^3/UL (ref 1–4.8)
LYMPHOCYTES NFR BLD AUTO: 2 % (ref 24–48)
MCH RBC QN AUTO: 33 PG (ref 25–35)
MCHC RBC AUTO-ENTMCNC: 34 G/DL (ref 31–37)
MCV RBC AUTO: 96 FL (ref 79–100)
MONO #: 0.3 X10^3/UL (ref 0–1.1)
MONOCYTES NFR BLD: 2 % (ref 0–9)
NEUT #: 11.6 X10^3UL (ref 1.8–7.7)
NEUTROPHILS NFR BLD AUTO: 96 % (ref 31–73)
NITRITE UR QL STRIP: (no result)
PLATELET # BLD AUTO: 196 X10^3/UL (ref 140–400)
POTASSIUM SERPL-SCNC: 3.9 MMOL/L (ref 3.5–5.1)
PROT SERPL-MCNC: 7.4 G/DL (ref 6.4–8.2)
RBC # BLD AUTO: 4.27 X10^6/UL (ref 3.5–5.4)
RBC #/AREA URNS HPF: 0 /HPF (ref 0–2)
SODIUM SERPL-SCNC: 142 MMOL/L (ref 136–145)
SP GR UR STRIP: 1.01
SQUAMOUS #/AREA URNS LPF: (no result) /LPF
UROBILINOGEN UR-MCNC: 2 MG/DL
WBC # BLD AUTO: 12.2 X10^3/UL (ref 4–11)
WBC #/AREA URNS HPF: (no result) /HPF (ref 0–4)

## 2018-05-15 PROCEDURE — 80053 COMPREHEN METABOLIC PANEL: CPT

## 2018-05-15 PROCEDURE — 99285 EMERGENCY DEPT VISIT HI MDM: CPT

## 2018-05-15 PROCEDURE — 83605 ASSAY OF LACTIC ACID: CPT

## 2018-05-15 PROCEDURE — 96374 THER/PROPH/DIAG INJ IV PUSH: CPT

## 2018-05-15 PROCEDURE — 36415 COLL VENOUS BLD VENIPUNCTURE: CPT

## 2018-05-15 PROCEDURE — 85025 COMPLETE CBC W/AUTO DIFF WBC: CPT

## 2018-05-15 PROCEDURE — 84484 ASSAY OF TROPONIN QUANT: CPT

## 2018-05-15 PROCEDURE — 81001 URINALYSIS AUTO W/SCOPE: CPT

## 2018-05-15 PROCEDURE — 83690 ASSAY OF LIPASE: CPT

## 2018-05-15 PROCEDURE — 74177 CT ABD & PELVIS W/CONTRAST: CPT

## 2018-05-15 PROCEDURE — 96361 HYDRATE IV INFUSION ADD-ON: CPT

## 2018-05-15 NOTE — RAD
CT scan of the abdomen and pelvis with contrast 5/15/2018

 

CLINICAL HISTORY: Nausea, vomiting and diarrhea.

 

TECHNIQUE: After the intravenous administration of 60 cc of Omnipaque 300,

contiguous, 5 mm axial sections were obtained through the abdomen and 

pelvis.

 

One or more of the following individualized dose reduction techniques were

utilized for this study:

 

1. Automated exposure control.

2. Adjustment of the mA and/or kV according to patient size.

3. Use of iterative reconstruction technique.

 

 

FINDINGS: Comparison study is dated 6/30/2017.

 

Images through the lung bases demonstrate minimal dependent subsegmental 

atelectasis bilaterally.

 

The liver, spleen, pancreas, adrenal glands and kidneys are within normal 

limits.

 

Mild scattered atherosclerotic calcification of the abdominal aorta and 

its branches is noted. The abdominal aorta tapers normally. The 

gallbladder is well-distended. No free fluid or free air is seen within 

the abdomen. There is no evidence of bowel obstruction. Surgical clips are

seen posterior to the cecum consistent with an appendectomy.

 

Images through the pelvis demonstrate the urinary bladder distended with 

urine. A 5.4 cm partially calcified fibroid is seen projecting to the 

right of midline anteriorly from body/fundus of the uterus, unchanged. No 

free fluid is noted. Minimal S-shaped curvature of the thoracolumbar spine

is seen.

 

The small bowel obstruction seen on the previous examination has resolved.

 

Impression: No acute abnormality is seen.

 

Electronically signed by: Jack Palomino MD (5/15/2018 9:44 AM) [facility]

## 2018-05-15 NOTE — PHYS DOC
Past History


Past Medical History:  No Pertinent History


Past Surgical History:  Appendectomy, Other


Smoking:  Non-smoker


Alcohol Use:  None


Drug Use:  None





Adult General


Chief Complaint


Chief Complaint:  NAUSEA/VOMITING/DIARRHEA





HPI


HPI





57-year-old female patient states she flew from [place] last night and had a 

homemade dinner and in the short time started to have multiple episodes of 

nonbloody vomiting. Patient states she had 7 or 8 episodes of vomiting since 

last night and this morning had 3 episodes of diarrhea with left lower quadrant 

cramping pain during episodes of diarrhea as a moderate pain. Patient denies 

fever and chills and urinary symptom.  Patient complaining of generalized 

weakness and dizziness without chest pain and shortness of breath. Patient 

states her other family members had the same food without problem. Patient had 

history of complicated laparoscopic appendectomy in 2017 with laparotomy 

and partial colon resection.





Review of Systems


Review of Systems





Constitutional: Denies fever or chills , reports generalized weakness[]


Eyes: Denies change in visual acuity, redness, or eye pain []


HENT: Denies nasal congestion or sore throat []


Respiratory: Denies cough or shortness of breath []


Cardiovascular: No additional information not addressed in HPI []


GI: Reports abdominal pain, nausea, vomiting, diarrhea []


: Denies dysuria or hematuria []


Musculoskeletal: Denies back pain or joint pain []


Integument: Denies rash or skin lesions []


Neurologic: Denies headache, focal weakness or sensory changes []


Endocrine: Denies polyuria or polydipsia []





All other systems were reviewed and found to be within normal limits, except as 

documented in this note.





Current Medications


Current Medications





Current Medications








 Medications


  (Trade)  Dose


 Ordered  Sig/Naima  Start Time


 Stop Time Status Last Admin


Dose Admin


 


 Info


  (Do NOT chart on


 this entry -- for


 MONITORING)  1 each  PRN DAILY  PRN  5/15/18 08:30


 18 08:29   





 


 Iohexol


  (Omnipaque 300


 Mg/ml)  75 ml  1X  ONCE  5/15/18 08:45


 5/15/18 08:46 DC 5/15/18 09:03


75 ML


 


 Prochlorperazine


 Edisylate


  (Compazine)  10 mg  1X  ONCE  5/15/18 07:15


 5/15/18 07:16 DC 5/15/18 07:22


10 MG


 


 Sodium Chloride  1,000 ml @ 


 1,000 mls/hr  1X  ONCE  5/15/18 08:30


 5/15/18 09:29   





 


 Sodium Chloride


  (Normal Saline


 Flush)  10 ml  QSHIFT  PRN  5/15/18 07:00


     














Allergies


Allergies





Allergies








Coded Allergies Type Severity Reaction Last Updated Verified


 


  No Known Drug Allergies    17 No











Physical Exam


Physical Exam





Constitutional: Well developed, well nourished, mild distress, non-toxic 

appearance. []


HENT: Normocephalic, atraumatic, bilateral external ears normal, oropharynx dry

, no oral exudates, nose normal. []


Eyes: PERRLA, EOMI, conjunctiva normal, no discharge. [] 


Neck: Normal range of motion, no tenderness, supple, no stridor. [] 


Cardiovascular:Heart rate regular rhythm, no murmur []


Lungs & Thorax:  Bilateral breath sounds clear to auscultation []


Abdomen: Bowel sounds are hyperactive ,  soft, no tenderness, no masses, no 

pulsatile masses. [] 


Skin: Warm, dry, no erythema, no rash. [] 


Back: No tenderness, no CVA tenderness. [] 


Extremities: No tenderness, no cyanosis, no clubbing, ROM intact, no edema. [] 


Neurologic: Alert and oriented X 3, normal motor function, normal sensory 

function, no focal deficits noted. []


Psychologic: Affect normal, judgement normal, mood normal. []





Current Patient Data


Lab Results





 Laboratory Tests








Test


 5/15/18


04:15 5/15/18


07:15


 


White Blood Count


 12.2 x10^3/uL


(4.0-11.0)  H 





 


Red Blood Count


 4.27 x10^6/uL


(3.50-5.40) 





 


Hemoglobin


 14.0 g/dL


(12.0-15.5) 





 


Hematocrit


 40.8 %


(36.0-47.0) 





 


Mean Corpuscular Volume


 96 fL ()


 





 


Mean Corpuscular Hemoglobin 33 pg (25-35)   


 


Mean Corpuscular Hemoglobin


Concent 34 g/dL


(31-37) 





 


Red Cell Distribution Width


 12.3 %


(11.5-14.5) 





 


Platelet Count


 196 x10^3/uL


(140-400) 





 


Neutrophils (%) (Auto) 96 % (31-73)  H 


 


Lymphocytes (%) (Auto) 2 % (24-48)  L 


 


Monocytes (%) (Auto) 2 % (0-9)   


 


Eosinophils (%) (Auto) 0 % (0-3)   


 


Basophils (%) (Auto) 0 % (0-3)   


 


Neutrophils # (Auto)


 11.6 x10^3uL


(1.8-7.7)  H 





 


Lymphocytes # (Auto)


 0.2 x10^3/uL


(1.0-4.8)  L 





 


Monocytes # (Auto)


 0.3 x10^3/uL


(0.0-1.1) 





 


Eosinophils # (Auto)


 0.0 x10^3/uL


(0.0-0.7) 





 


Basophils # (Auto)


 0.0 x10^3/uL


(0.0-0.2) 





 


Sodium Level


 142 mmol/L


(136-145) 





 


Potassium Level


 3.9 mmol/L


(3.5-5.1) 





 


Chloride Level


 104 mmol/L


() 





 


Carbon Dioxide Level


 29 mmol/L


(21-32) 





 


Anion Gap 9 (6-14)   


 


Blood Urea Nitrogen


 22 mg/dL


(7-20)  H 





 


Creatinine


 1.1 mg/dL


(0.6-1.0)  H 





 


Estimated GFR


(Cockcroft-Gault) 51.2  


 





 


BUN/Creatinine Ratio 20 (6-20)   


 


Glucose Level


 136 mg/dL


(70-99)  H 





 


Calcium Level


 8.9 mg/dL


(8.5-10.1) 





 


Total Bilirubin


 1.3 mg/dL


(0.2-1.0)  H 





 


Aspartate Amino Transferase


(AST) 28 U/L (15-37)


 





 


Alanine Aminotransferase (ALT)


 29 U/L (14-59)


 





 


Alkaline Phosphatase


 72 U/L


() 





 


Troponin I Quantitative


 < 0.017 ng/mL


(0-0.055) 





 


Total Protein


 7.4 g/dL


(6.4-8.2) 





 


Albumin


 3.8 g/dL


(3.4-5.0) 





 


Albumin/Globulin Ratio 1.1 (1.0-1.7)   


 


Lipase


 99 U/L


() 





 


Lactic Acid Level


 


 0.9 mmol/L


(0.4-2.0)











EKG


EKG


[]





Radiology/Procedures


Radiology/Procedures


[


 Signed





PATIENT: KASH SANTACRUZ ACCOUNT: [id number] MRN#: [id number]


: 1960 LOCATION: ER AGE: 57


SEX: F EXAM DT: 05/15/18 ACCESSION#: [id number]


STATUS: REG ER ORD. PHYSICIAN: CB HOGUE MD 


REASON: nausea and vomiting and diarrhea


PROCEDURE: CT ABD PELV W/ IV CONTRST ONLY





CT scan of the abdomen and pelvis with contrast 5/15/2018


 


CLINICAL HISTORY: Nausea, vomiting and diarrhea.


 


TECHNIQUE: After the intravenous administration of 60 cc of Omnipaque 300,


contiguous, 5 mm axial sections were obtained through the abdomen and 


pelvis.


 


One or more of the following individualized dose reduction techniques were


utilized for this study:


 


1. Automated exposure control.


2. Adjustment of the mA and/or kV according to patient size.


3. Use of iterative reconstruction technique.


 


 


FINDINGS: Comparison study is dated 2017.


 


Images through the lung bases demonstrate minimal dependent subsegmental 


atelectasis bilaterally.


 


The liver, spleen, pancreas, adrenal glands and kidneys are within normal 


limits.


 


Mild scattered atherosclerotic calcification of the abdominal aorta and 


its branches is noted. The abdominal aorta tapers normally. The 


gallbladder is well-distended. No free fluid or free air is seen within 


the abdomen. There is no evidence of bowel obstruction. Surgical clips are


seen posterior to the cecum consistent with an appendectomy.


 


Images through the pelvis demonstrate the urinary bladder distended with 


urine. A 5.4 cm partially calcified fibroid is seen projecting to the 


right of midline anteriorly from body/fundus of the uterus, unchanged. No 


free fluid is noted. Minimal S-shaped curvature of the thoracolumbar spine


is seen.


 


The small bowel obstruction seen on the previous examination has resolved.


 


Impression: No acute abnormality is seen.


 


Electronically signed by: Jack Palomino MD (5/15/2018 9:44 AM) [facility]














DICTATED AND SIGNED BY:     JACK PALOMINO MD


DATE:     05/15/18 0935





CC: AQUILES MARTINEZ MD; CB HOGUE MD ~


]





Course & Med Decision Making


Course & Med Decision Making


Pertinent Labs and Imaging studies reviewed. (See chart for details)


Evaluation of patient in ER showed 57-year-old female patient with complaining 

of frequent episodes of nausea and vomiting and diarrhea since last night. 

Patient had blood pressure of 88 at arrival to ER that improved with IV fluids 

more than 100. Patient tolerated oral intake. Labs showed mild leukocytosis and 

elevation of BUN/creatinine. CT abdomen and pelvis was unremarkable. The 

patient for Zofran given and patient instructed to take oral intake and follow 

with her primary care physician as needed.


discharge:





I've spoken with the patient and/or caregivers. I've explained the patient's 

condition, diagnosis and treatment plan based on information available to me at 

this time. I've answered the patient's and/or caregivers questions and 

addressed any concerns. The patient and/or caregivers have a good understanding 

the patient's diagnosis, condition and treatment plan as can be expected at 

this point. Vital signs have been stabilized. The patient's condition is stable 

for discharge from the emergency department.





The patient will pursue further outpatient evaluation with her primary care 

provider or other designated consulting physician as outlined in the discharge 

instructions. Patient and/or caregivers are agreeable to this plan of care and 

follow-up instructions have been explained in detail. The patient and/or 

caregivers have received these instructions in written format and expressed 

understanding of these discharge instructions. The patient and her caregivers 

are aware that if any significant change in condition or worsening of symptoms 

should prompt him to immediately return to this of the closest emergency 

department.  If an emergent department is not readily available I would 

encourage him to call 911.





Dragon Disclaimer


Dragon Disclaimer


This electronic medical record was generated, in whole or in part, using a 

voice recognition dictation system.





Departure


Departure:


Impression:  


 Primary Impression:  


 Gastroenteritis


 Additional Impressions:  


 Dehydration


 Hypotension


Disposition:   HOME, SELF-CARE (At 1000)


Condition:  IMPROVED


Referrals:  


AQUILES MARTINEZ MD (PCP)


Patient Instructions:  Viral Gastroenteritis





Additional Instructions:  


Drink plenty of liquids


Follow-up with your primary care physician in 3-5 days


Return to ER if not getting better


Scripts


Ondansetron (ZOFRAN ODT) 4 Mg Tab.rapdis


1 TAB SL Q8HRS, #15 TAB


   Prov: CB HOGUE MD         5/15/18





Problem Qualifiers











CB HOGUE MD May 15, 2018 09:34

## 2020-02-07 ENCOUNTER — HOSPITAL ENCOUNTER (OUTPATIENT)
Dept: HOSPITAL 63 - RAD | Age: 60
Discharge: HOME | End: 2020-02-07
Payer: OTHER GOVERNMENT

## 2020-02-07 DIAGNOSIS — N82.8: Primary | ICD-10-CM

## 2020-02-07 PROCEDURE — 74270 X-RAY XM COLON 1CNTRST STD: CPT

## 2020-02-07 NOTE — RAD
Single contrast barium enema

 

HISTORY: Incomplete colonoscopy.

 

PROCEDURE: The barium enema tip was placed without difficulty. Contrast 

column was followed throughout the colon from the rectum to the cecum. No 

persistent mass or stricture is identified. The contrast column readily 

reached the cecum without difficulty. Note that this exam is very limited 

with regard to detection of mucosal abnormalities or intraluminal lesions.

The patient tolerated the procedure well.

 

Fluoroscopy time: 4.7 minutes.

8 spot images were obtained in addition to overhead radiographs.

 

IMPRESSION: No evidence of fixed stricture or flow limiting lesion. Could 

consider CT abdomen pelvis or CT colonography for further evaluation, as 

indicated. 

 

Electronically signed by: Genaro Gonzales MD (2/7/2020 11:19 AM) [facility]